# Patient Record
(demographics unavailable — no encounter records)

---

## 2025-04-07 NOTE — PROCEDURE
[de-identified] : Dyllan Ricketts M.D.  [de-identified] : Procedure performed: Nasal Endoscopy- Diagnostic Pre-op indication(s): nasal congestion Post-op indication(s): nasal congestion Verbal and/or written consent obtained from patient Anterior rhinoscopy insufficient to account for symptoms Scope #: 3, flexible fiber optic telescope The scope was introduced in the nasal passage between the middle and inferior turbinates to exam the inferior portion of the middle meatus and the fontanelle, as well as the maxillary ostia. Next, the scope was passed medically and posteriorly to the middle turbinates to examine the sphenoethmoid recess and the superior turbinate region.  Upon visualization the finders are as follows: Nasal Septum: R sharp edge of septum, Bilateral - Mucosa: boggy turbinates, Mucous: scant, Polyp: + R mid level of inferior turbinate and L filling to floor of nose, Inferior Turbinate: boggy, Middle Turbinate: normal, Superior Turbinate: normal, Inferior Meatus: narrow, Middle Meatus: narrow, Super Meatus: normal, Sphenoethmoidal Recess: clear

## 2025-04-07 NOTE — CONSULT LETTER
[FreeTextEntry1] : Dear Dr. SEEMA GIPSON  I had the pleasure of evaluating your patient NARENDRA OLMOS, thank you for allowing us to participate in their care. please see full note detailing our visit below. If you have any questions, please do not hesitate to call me and I would be happy to discuss further.   Dyllan Ricketts M.D. Attending Physician,   Department of Otolaryngology - Head and Neck Surgery [facility]  Office: [phone] Fax: [phone]

## 2025-04-07 NOTE — HISTORY OF PRESENT ILLNESS
[de-identified] : 66 year old male presents for evaluation of stuffy nose and referred by Dr Villaseñor for nasal polyps. States right side is more clear, left is blocked. Denies reduced sense of smell. Denies sinus pressure. Using fluticasone daily for a few years. Denies previous surgeries. Denies sinus infections that he knows of. Has been allergy tested in the past which was negative.  Last course of oral steroids was 4 months ago

## 2025-04-07 NOTE — ASSESSMENT
[FreeTextEntry1] : 66 year old male presents for evaluation of nasal polyps. On exam, R sharp edge of septum, polyps R mid level of inferior turbinate and L filling to floor of nose, thick secretions dripping back.   Discussed options: 1) conservative management with nasal sprays and medicated washes 2) sinusitis regiment  3) CT scan of sinuses for further evaluation   4) allergy referral, biologic  5) sinus procedure, septum  - patient elected for option 3, and 1, will follow up with results, will see extent of disease and if fully filled sinuses. polyps look classic - multiple smooth round and translucent, respond to steroids, very long standing. pt declined biopsy  - start Nasal irrigation with budesonide and showed how to use it to maximize effectiveness - follow up in 1 month

## 2025-04-07 NOTE — END OF VISIT
[FreeTextEntry3] : I personally saw and examined the patient in detail. I spoke to KRISTEN Greene regarding the assessment and plan of care.  I preformed the procedures and I reviewed the above assessment and plan of care, and agree. I have made changes in changes in the body of the note where appropriate.

## 2025-05-22 NOTE — ASSESSMENT
[FreeTextEntry1] : 66 year old male presents for evaluation of nasal polyps. On exam, R sharp edge of septum, polyps R mid level of inferior turbinate and L filling to floor of nose.  polyps look classic - multiple smooth round and translucent, respond to steroids, very long standing  Discussed options: 1) continue conservative management with nasal sprays and medicated washes 2) office sinus procedure  3) OR sinuses, septum - may be better with severity of NSD  4) allergy referral, biologic  - patient will let us know how he would like to proceed, otherwise will continue with conservative management. discussed risks of infection invading skull base/eye, chronic inflammation and obstruction. pt feels doing OK right now symptomatically, and while I recommended doing surgery or try a biologic or a course of treatment to see if he can improve,  he understood he will let us know  - continue Flonase. A topical steroid reduce mucosal swelling, illustrated appropriate use and how to reduce the risk of bleeding  - Nasal irrigation and showed how to use it to maximize effectiveness   - Risks benefits and alternatives of endoscopic sinus surgery with possible image guidance, septoplasty bilateral inferior turbinate reduction discussed with patient at length. Risks discussed include but were not limited to bleeding, infection, persistent symptoms, scarring, injury to the skull base and brain and CSF leak, injury to orbit and eye, crusting, septal hematoma, septal perforation results in whistling, empty nose syndrome, crusting and bleeding as well as continued nasal obstruction etc.  were discussed. For polyps, discussed very high recurrence rate that require future surveillance, maintenance and likelihood of need for further procedures. Also discussed option of office balloon/hybrid balloon dilation and office sinus surgery - similar risk profile, will not address septum/ turbs and takes a generally more conservative approach with quicker recovery, however higher possibility for need for future intervention.

## 2025-05-22 NOTE — HISTORY OF PRESENT ILLNESS
[de-identified] : 66 year old male presents for evaluation of stuffy nose and referred by Dr Villaseñor for nasal polyps. States right side is more clear, left is blocked. Denies reduced sense of smell. Denies sinus pressure. Using fluticasone daily for a few years. Denies previous surgeries. Denies sinus infections that he knows of. Has been allergy tested in the past which was negative.  Last course of oral steroids was 4 months ago  [FreeTextEntry1] : patient following up s/p sinusitis regiment and here to review CT scan results which showed pansinusitis. Felt improvement in breathing with the regiment, still using flonase and nasal washes. No facial pressure at the moment.

## 2025-05-22 NOTE — REASON FOR VISIT
[Subsequent Evaluation] : a subsequent evaluation for [FreeTextEntry2] : nasal polyps This was a shared visit with the ELIN. I reviewed and verified the documentation and independently performed the documented:

## 2025-05-22 NOTE — PROCEDURE
[de-identified] : Dyllan Ricketts M.D.  [de-identified] : Procedure performed: Nasal Endoscopy- Diagnostic Pre-op indication(s): nasal congestion Post-op indication(s): nasal congestion Verbal and/or written consent obtained from patient Anterior rhinoscopy insufficient to account for symptoms Scope #: 3, flexible fiber optic telescope The scope was introduced in the nasal passage between the middle and inferior turbinates to exam the inferior portion of the middle meatus and the fontanelle, as well as the maxillary ostia. Next, the scope was passed medically and posteriorly to the middle turbinates to examine the sphenoethmoid recess and the superior turbinate region.  Upon visualization the finders are as follows: Nasal Septum: R sharp edge of septum, Bilateral - Mucosa: boggy turbinates, Mucous: scant, Polyp: + R mid level of inferior turbinate and L filling to floor of nose, Inferior Turbinate: boggy, Middle Turbinate: normal, Superior Turbinate: normal, Inferior Meatus: narrow, Middle Meatus: narrow, Super Meatus: normal, Sphenoethmoidal Recess: clear